# Patient Record
Sex: FEMALE | Race: WHITE | Employment: OTHER | URBAN - METROPOLITAN AREA
[De-identification: names, ages, dates, MRNs, and addresses within clinical notes are randomized per-mention and may not be internally consistent; named-entity substitution may affect disease eponyms.]

---

## 2017-09-01 ENCOUNTER — HOSPITAL ENCOUNTER (OUTPATIENT)
Dept: LAB | Age: 62
Discharge: HOME | End: 2017-09-01
Payer: COMMERCIAL

## 2017-09-01 LAB
ALBUMIN SERPL-MCNC: 4 G/DL (ref 3.4–5)
ALBUMIN/GLOB SERPL: 1.2 {RATIO} (ref 0.8–1.7)
ALP SERPL-CCNC: 57 U/L (ref 45–117)
ALT SERPL-CCNC: 61 U/L (ref 13–56)
ANION GAP SERPL CALC-SCNC: 10 MMOL/L (ref 3–18)
AST SERPL-CCNC: 30 U/L (ref 15–37)
BASOPHILS # BLD: 0.1 K/UL (ref 0–0.06)
BASOPHILS NFR BLD: 1 % (ref 0–2)
BILIRUB SERPL-MCNC: 0.5 MG/DL (ref 0.2–1)
BUN SERPL-MCNC: 13 MG/DL (ref 7–18)
BUN/CREAT SERPL: 16 (ref 12–20)
CALCIUM SERPL-MCNC: 8.8 MG/DL (ref 8.5–10.1)
CHLORIDE SERPL-SCNC: 106 MMOL/L (ref 100–108)
CO2 SERPL-SCNC: 26 MMOL/L (ref 21–32)
CREAT SERPL-MCNC: 0.82 MG/DL (ref 0.6–1.3)
DIFFERENTIAL METHOD BLD: ABNORMAL
EOSINOPHIL # BLD: 0.4 K/UL (ref 0–0.4)
EOSINOPHIL NFR BLD: 6 % (ref 0–5)
ERYTHROCYTE [DISTWIDTH] IN BLOOD BY AUTOMATED COUNT: 13.6 % (ref 11.6–14.5)
GLOBULIN SER CALC-MCNC: 3.3 G/DL (ref 2–4)
GLUCOSE SERPL-MCNC: 114 MG/DL (ref 74–99)
HCT VFR BLD AUTO: 42.6 % (ref 35–45)
HGB BLD-MCNC: 14.2 G/DL (ref 12–16)
INR PPP: 0.9 (ref 0.8–1.2)
LYMPHOCYTES # BLD: 2.8 K/UL (ref 0.9–3.6)
LYMPHOCYTES NFR BLD: 42 % (ref 21–52)
MCH RBC QN AUTO: 31.3 PG (ref 24–34)
MCHC RBC AUTO-ENTMCNC: 33.3 G/DL (ref 31–37)
MCV RBC AUTO: 93.8 FL (ref 74–97)
MONOCYTES # BLD: 0.4 K/UL (ref 0.05–1.2)
MONOCYTES NFR BLD: 7 % (ref 3–10)
NEUTS SEG # BLD: 2.9 K/UL (ref 1.8–8)
NEUTS SEG NFR BLD: 44 % (ref 40–73)
PLATELET # BLD AUTO: 245 K/UL (ref 135–420)
PMV BLD AUTO: 9.6 FL (ref 9.2–11.8)
POTASSIUM SERPL-SCNC: 4.3 MMOL/L (ref 3.5–5.5)
PROT SERPL-MCNC: 7.3 G/DL (ref 6.4–8.2)
PROTHROMBIN TIME: 11.9 SEC (ref 11.5–15.2)
RBC # BLD AUTO: 4.54 M/UL (ref 4.2–5.3)
SODIUM SERPL-SCNC: 142 MMOL/L (ref 136–145)
WBC # BLD AUTO: 6.5 K/UL (ref 4.6–13.2)

## 2017-09-01 PROCEDURE — 36415 COLL VENOUS BLD VENIPUNCTURE: CPT | Performed by: INTERNAL MEDICINE

## 2017-09-01 PROCEDURE — 85025 COMPLETE CBC W/AUTO DIFF WBC: CPT | Performed by: INTERNAL MEDICINE

## 2017-09-01 PROCEDURE — 85610 PROTHROMBIN TIME: CPT | Performed by: INTERNAL MEDICINE

## 2017-09-01 PROCEDURE — 80053 COMPREHEN METABOLIC PANEL: CPT | Performed by: INTERNAL MEDICINE

## 2017-09-01 RX ORDER — METOPROLOL TARTRATE 25 MG/1
12.5 TABLET, FILM COATED ORAL 2 TIMES DAILY
COMMUNITY
End: 2017-09-05

## 2017-09-01 RX ORDER — FLUTICASONE PROPIONATE 50 MCG
2 SPRAY, SUSPENSION (ML) NASAL
COMMUNITY

## 2017-09-01 RX ORDER — MV/FA/DHA/EPA/FISH OIL/SAW/GNK 400MCG-200
500 COMBINATION PACKAGE (EA) ORAL DAILY
COMMUNITY

## 2017-09-01 RX ORDER — GLUCOSAMINE SULFATE 1500 MG
5000 POWDER IN PACKET (EA) ORAL DAILY
COMMUNITY

## 2017-09-01 RX ORDER — LORATADINE 10 MG/1
10 TABLET ORAL
COMMUNITY

## 2017-09-01 RX ORDER — ASPIRIN 81 MG/1
TABLET ORAL DAILY
COMMUNITY

## 2017-09-05 ENCOUNTER — HOSPITAL ENCOUNTER (OUTPATIENT)
Dept: CARDIAC CATH/INVASIVE PROCEDURES | Age: 62
Discharge: HOME | End: 2017-09-05
Attending: INTERNAL MEDICINE | Admitting: INTERNAL MEDICINE
Payer: COMMERCIAL

## 2017-09-05 VITALS
WEIGHT: 183 LBS | HEART RATE: 63 BPM | HEIGHT: 65 IN | RESPIRATION RATE: 13 BRPM | SYSTOLIC BLOOD PRESSURE: 116 MMHG | TEMPERATURE: 99.1 F | BODY MASS INDEX: 30.49 KG/M2 | OXYGEN SATURATION: 97 % | DIASTOLIC BLOOD PRESSURE: 87 MMHG

## 2017-09-05 LAB
FAX TO INFO,FAXT: NORMAL
FAX TO NUMBER,FAXN: NORMAL

## 2017-09-05 PROCEDURE — 74011250636 HC RX REV CODE- 250/636

## 2017-09-05 PROCEDURE — 74011636320 HC RX REV CODE- 636/320: Performed by: INTERNAL MEDICINE

## 2017-09-05 PROCEDURE — 74011250636 HC RX REV CODE- 250/636: Performed by: INTERNAL MEDICINE

## 2017-09-05 PROCEDURE — 74011000250 HC RX REV CODE- 250

## 2017-09-05 PROCEDURE — 77030013797 CARDIAC CATHETERIZATION

## 2017-09-05 PROCEDURE — 74011000250 HC RX REV CODE- 250: Performed by: INTERNAL MEDICINE

## 2017-09-05 RX ORDER — VERAPAMIL HYDROCHLORIDE 2.5 MG/ML
INJECTION, SOLUTION INTRAVENOUS
Status: DISCONTINUED
Start: 2017-09-05 | End: 2017-09-05 | Stop reason: HOSPADM

## 2017-09-05 RX ORDER — FENTANYL CITRATE 50 UG/ML
INJECTION, SOLUTION INTRAMUSCULAR; INTRAVENOUS
Status: COMPLETED
Start: 2017-09-05 | End: 2017-09-05

## 2017-09-05 RX ORDER — SODIUM CHLORIDE 0.9 % (FLUSH) 0.9 %
5-10 SYRINGE (ML) INJECTION AS NEEDED
Status: DISCONTINUED | OUTPATIENT
Start: 2017-09-05 | End: 2017-09-05 | Stop reason: HOSPADM

## 2017-09-05 RX ORDER — FENTANYL CITRATE 50 UG/ML
25-100 INJECTION, SOLUTION INTRAMUSCULAR; INTRAVENOUS
Status: DISCONTINUED | OUTPATIENT
Start: 2017-09-05 | End: 2017-09-05 | Stop reason: HOSPADM

## 2017-09-05 RX ORDER — HEPARIN SODIUM 200 [USP'U]/100ML
INJECTION, SOLUTION INTRAVENOUS
Status: COMPLETED
Start: 2017-09-05 | End: 2017-09-05

## 2017-09-05 RX ORDER — MIDAZOLAM HYDROCHLORIDE 1 MG/ML
.5-2 INJECTION, SOLUTION INTRAMUSCULAR; INTRAVENOUS
Status: DISCONTINUED | OUTPATIENT
Start: 2017-09-05 | End: 2017-09-05 | Stop reason: HOSPADM

## 2017-09-05 RX ORDER — MIDAZOLAM HYDROCHLORIDE 1 MG/ML
INJECTION, SOLUTION INTRAMUSCULAR; INTRAVENOUS
Status: COMPLETED
Start: 2017-09-05 | End: 2017-09-05

## 2017-09-05 RX ORDER — SODIUM CHLORIDE 9 MG/ML
75 INJECTION, SOLUTION INTRAVENOUS CONTINUOUS
Status: DISCONTINUED | OUTPATIENT
Start: 2017-09-05 | End: 2017-09-05 | Stop reason: HOSPADM

## 2017-09-05 RX ORDER — HEPARIN SODIUM 1000 [USP'U]/ML
1000-4000 INJECTION, SOLUTION INTRAVENOUS; SUBCUTANEOUS
Status: DISCONTINUED | OUTPATIENT
Start: 2017-09-05 | End: 2017-09-05 | Stop reason: HOSPADM

## 2017-09-05 RX ORDER — HEPARIN SODIUM 200 [USP'U]/100ML
500 INJECTION, SOLUTION INTRAVENOUS
Status: DISCONTINUED | OUTPATIENT
Start: 2017-09-05 | End: 2017-09-05 | Stop reason: HOSPADM

## 2017-09-05 RX ORDER — LIDOCAINE HYDROCHLORIDE 10 MG/ML
INJECTION INFILTRATION; PERINEURAL
Status: COMPLETED
Start: 2017-09-05 | End: 2017-09-05

## 2017-09-05 RX ORDER — LIDOCAINE HYDROCHLORIDE 10 MG/ML
3-30 INJECTION INFILTRATION; PERINEURAL
Status: DISCONTINUED | OUTPATIENT
Start: 2017-09-05 | End: 2017-09-05 | Stop reason: HOSPADM

## 2017-09-05 RX ORDER — HEPARIN SODIUM 1000 [USP'U]/ML
INJECTION, SOLUTION INTRAVENOUS; SUBCUTANEOUS
Status: COMPLETED
Start: 2017-09-05 | End: 2017-09-05

## 2017-09-05 RX ORDER — SODIUM CHLORIDE 0.9 % (FLUSH) 0.9 %
5-10 SYRINGE (ML) INJECTION EVERY 8 HOURS
Status: DISCONTINUED | OUTPATIENT
Start: 2017-09-05 | End: 2017-09-05 | Stop reason: HOSPADM

## 2017-09-05 RX ADMIN — VERAPAMIL HYDROCHLORIDE 3 ML: 2.5 INJECTION INTRAVENOUS at 14:10

## 2017-09-05 RX ADMIN — HEPARIN SODIUM 4000 UNITS: 1000 INJECTION, SOLUTION INTRAVENOUS; SUBCUTANEOUS at 14:11

## 2017-09-05 RX ADMIN — SODIUM CHLORIDE 75 ML/HR: 900 INJECTION, SOLUTION INTRAVENOUS at 14:13

## 2017-09-05 RX ADMIN — FENTANYL CITRATE 50 MCG: 50 INJECTION, SOLUTION INTRAMUSCULAR; INTRAVENOUS at 14:03

## 2017-09-05 RX ADMIN — IOPAMIDOL 50 ML: 510 INJECTION, SOLUTION INTRAVASCULAR at 14:27

## 2017-09-05 RX ADMIN — LIDOCAINE HYDROCHLORIDE 3 ML: 10 INJECTION INFILTRATION; PERINEURAL at 14:10

## 2017-09-05 RX ADMIN — LIDOCAINE HYDROCHLORIDE 3 ML: 10 INJECTION, SOLUTION INFILTRATION; PERINEURAL at 14:10

## 2017-09-05 RX ADMIN — SODIUM CHLORIDE 75 ML/HR: 900 INJECTION, SOLUTION INTRAVENOUS at 10:53

## 2017-09-05 RX ADMIN — MIDAZOLAM HYDROCHLORIDE 1 MG: 1 INJECTION, SOLUTION INTRAMUSCULAR; INTRAVENOUS at 14:04

## 2017-09-05 RX ADMIN — HEPARIN SODIUM 1000 UNITS: 200 INJECTION, SOLUTION INTRAVENOUS at 14:12

## 2017-09-05 NOTE — DISCHARGE SUMMARY
Discharge Summary    Patient: Hayes Leonardo MRN: [id number]  CSN: [id number]    YOB: 1955  Age: 57 y.o. Sex: female    DOA: 9/5/2017 LOS:  LOS: 0 days   Discharge Date:      Primary Care Provider:  Mario Pennington DO    Admission Diagnoses: angina, abnormal stress test    Discharge Diagnoses:  CAD   Problem List as of 9/5/2017  Date Reviewed: 9/5/2017    None          Discharge Medications:     Current Discharge Medication List      CONTINUE these medications which have NOT CHANGED    Details   DEXTRAN 70/HYPROMELLOSE (ARTIFICIAL TEARS, PF, OP) Apply  to eye. aspirin delayed-release 81 mg tablet Take  by mouth daily. fluticasone (FLONASE) 50 mcg/actuation nasal spray 2 Sprays by Both Nostrils route daily as needed for Rhinitis. calcium-cholecalciferol, d3, (CALCIUM 600 + D) 600-125 mg-unit tab Take 1,200 mg by mouth. cholecalciferol (VITAMIN D3) 1,000 unit cap Take 5,000 Units by mouth daily. krill oil 500 mg cap Take 500 mg by mouth daily. loratadine (CLARITIN) 10 mg tablet Take 10 mg by mouth daily as needed for Allergies. atorvastatin (LIPITOR) 10 mg tablet Take 10 mg by mouth daily. STOP taking these medications       metoprolol tartrate (LOPRESSOR) 25 mg tablet Comments:   Reason for Stopping:               Discharge Condition: Stable     Procedures : LHC and coronary angiogram     Consults: None       PHYSICAL EXAM   Visit Vitals    /68 (BP 1 Location: Right arm, BP Patient Position: Supine)    Pulse (!) 56    Temp 99.1 °F (37.3 °C)    Resp 19    Ht 5' 5\" (1.651 m)    Wt 83 kg (183 lb)    SpO2 97%    BMI 30.45 kg/m2     General: Awake, cooperative, no acute distress    HEENT: NC, Atraumatic. PERRLA, EOMI. Anicteric sclerae. Lungs:  CTA Bilaterally. No Wheezing/Rhonchi/Rales. Heart:  Regular  rhythm,  No murmur, No Rubs, No Gallops  Abdomen: Soft, Non distended, Non tender.  +Bowel sounds,   Extremities: No c/c/e  Psych:   Not anxious or agitated. Neurologic:  No acute neurological deficits. Admission HPI : See H/P    Hospital Course : Out patient LHC and coronary angiogram     Activity: No weight lifting not more than 5 lbs for 1 week from left hand     Diet: Cardiac     Follow-up: Cardiology clinic after 2 weeks. Disposition: Home     Minutes spent on discharge: 30 minutes       Labs: Results:       Chemistry No results for input(s): GLU, NA, K, CL, CO2, BUN, CREA, CA, AGAP, BUCR, TBIL, GPT, AP, TP, ALB, GLOB, AGRAT in the last 72 hours. CBC w/Diff No results for input(s): WBC, RBC, HGB, HCT, PLT, GRANS, LYMPH, EOS, HGBEXT, HCTEXT, PLTEXT in the last 72 hours. Cardiac Enzymes No results for input(s): CPK, CKND1, RANJEET in the last 72 hours. No lab exists for component: CKRMB, TROIP   Coagulation No results for input(s): PTP, INR, APTT in the last 72 hours. No lab exists for component: INREXT    Lipid Panel No results found for: CHOL, CHOLPOCT, CHOLX, CHLST, CHOLV, 267707, HDL, LDL, LDLC, DLDLP, 524980, VLDLC, VLDL, TGLX, TRIGL, TRIGP, TGLPOCT, CHHD, CHHDX   BNP No results for input(s): BNPP in the last 72 hours. Liver Enzymes No results for input(s): TP, ALB, TBIL, AP, SGOT, GPT in the last 72 hours. No lab exists for component: DBIL   Thyroid Studies No results found for: T4, T3U, TSH, TSHEXT         Significant Diagnostic Studies: No results found. LHC and coronary angiogram done via left radial approach. LM is normal.  Proximal LAD has 20% stenosis. LCx has luminal irregularities. LAD/LCx has corkscrew appearance and has slow flow. RCA is dominant and has luminal irregularities. RCA has corkscrew appearance.      Patient tolerated procedure.     Medical management.       CC: Gildardo Duong DO

## 2017-09-05 NOTE — ROUTINE PROCESS
TRANSFER - OUT REPORT:  Verbal report given to [facility] RN on Brandon Dewey Pages being transferred to care unit for routine post - op   Report consisted of patients Situation, Background, Assessment and   Recommendations(SBAR). Information from the following report(s) SBAR was reviewed with the receiving nurse.     Cath Lab Report:    Procedure:  [x ] LHC  [ ] RHC  [ ] PTCA   [ ] Peripheral   [ ] Pacemaker [ ] BRANDON  [ ] [address]    Access site:   [x ] Radial     [ ] Brachial     [ ] Femoral    [ ] Jugular   [ ] Chest Wall       Sheath:           [x ] Pulled in Cath Lab   [ ] In place   [ ] To be pulled after:         Closure:          [x ] TR Band [ ] Radial Band  [ ] Right [x ] Left    [ ] Manual Pressure     [ ] Angio Seal     [ ] Star Close    [ ] Per Close    [ ] Safe Guard    Site Assessment:   [x ] Clean, Dry, No bleeding    [ ] Minor oozing          [ ] Hematoma: Description:  Intra procedure Medications:    Fentanyl: 50 mcg  Versed: 1 mg  Other:  Antiplatelet:    Lines:        Peripheral IV 09/05/17 Right Hand (Active)   Site Assessment Clean, dry, & intact 9/5/2017 10:53 AM   Phlebitis Assessment 0 9/5/2017 10:53 AM   Infiltration Assessment 0 9/5/2017 10:53 AM   Dressing Status Clean, dry, & intact 9/5/2017 10:53 AM   Dressing Type Transparent 9/5/2017 10:53 AM   Hub Color/Line Status Infusing;Pink 9/5/2017 10:53 AM   Alcohol Cap Used No 9/5/2017 10:53 AM          Patient Vitals for the past 4 hrs:   Temp Pulse Resp BP SpO2   09/05/17 1426 99.1 °F (37.3 °C) (!) 56 19 111/68 97 %   09/05/17 1050 99 °F (37.2 °C) (!) 53 18 120/76 97 %         Extended / Orthostatic Vitals:    Vital Signs  Level of Consciousness: Alert (09/05/17 1426)  Temp: 99.1 °F (37.3 °C) (09/05/17 1426)  Temp Source: Oral (09/05/17 1426)  Pulse (Heart Rate): (!) 56 (09/05/17 1426)  Heart Rate Source: Monitor (09/05/17 1426)  Cardiac Rhythm: Sinus bradycardia (09/05/17 1426)  Resp Rate: 19 (09/05/17 1426)  BP: 111/68 (09/05/17 1426)  MAP (Calculated): 82 (09/05/17 1426)  BP 1 Location: Right arm (09/05/17 1426)  BP Patient Position: Supine (09/05/17 1426)  MEWS Score: 1 (09/05/17 1426)         Oxygen Therapy  O2 Sat (%): 97 % (09/05/17 1426)  O2 Device: Nasal cannula (09/05/17 1426)  O2 Flow Rate (L/min): 2 l/min (09/05/17 1426)          Opportunity for questions and clarification was provided.

## 2017-09-05 NOTE — ROUTINE PROCESS
TRANSFER - IN REPORT:    Verbal report received from care unit on Brandon Dewey Pages  being received from care unit for ordered procedure      Report consisted of patients Situation, Background, Assessment and   Recommendations(SBAR). Information from the following report(s) SBAR was reviewed with the receiving nurse. Opportunity for questions and clarification was provided. Assessment completed upon patients arrival to unit and care assumed.        Sheath:                            Peripheral Intravenous Line:  Peripheral IV 09/05/17 Right Hand (Active)   Site Assessment Clean, dry, & intact 9/5/2017 10:53 AM   Phlebitis Assessment 0 9/5/2017 10:53 AM   Infiltration Assessment 0 9/5/2017 10:53 AM   Dressing Status Clean, dry, & intact 9/5/2017 10:53 AM   Dressing Type Transparent 9/5/2017 10:53 AM   Hub Color/Line Status Infusing;Pink 9/5/2017 10:53 AM   Alcohol Cap Used No 9/5/2017 10:53 AM       Extended / Orthostatic Vitals:    Vital Signs  Level of Consciousness: Alert (09/05/17 1050)  Temp: 99 °F (37.2 °C) (09/05/17 1050)  Temp Source: Oral (09/05/17 1050)  Pulse (Heart Rate): (!) 53 (09/05/17 1050)  Heart Rate Source: Monitor (09/05/17 1050)  Cardiac Rhythm: Sinus bradycardia (09/05/17 1050)  Resp Rate: 18 (09/05/17 1050)  BP: 120/76 (09/05/17 1050)  MAP (Calculated): 91 (09/05/17 1050)  BP 1 Location: Right arm (09/05/17 1050)  BP Patient Position: At rest (09/05/17 1050)  MEWS Score: 1 (09/05/17 1050)         Oxygen Therapy  O2 Sat (%): 97 % (09/05/17 1050)  O2 Device: Room air (09/05/17 1050)    Accompanied by Cath Lab [facility]

## 2017-09-05 NOTE — PROCEDURES
LHC and coronary angiogram done via left radial approach. LM is normal.  Proximal LAD has 20% stenosis. LCx has luminal irregularities. LAD/LCx has corkscrew appearance and has slow flow. RCA is dominant and has luminal irregularities. RCA has corkscrew appearance. Patient tolerated procedure. Medical management.

## 2017-09-05 NOTE — ROUTINE PROCESS
Cardiac Cath Lab:  Pre Procedure Chart Check     Patients chart was accessed and reviewed for possible and/or scheduled procedure. Creatinine Clearance:  CREATININE: 0.82 MG/DL (09/01/17 1010)  Estimated creatinine clearance: 75.7 mL/min    Total Contrast  Load:  3 x estimated clearance amount=  227.1  ml    75% of Contrast Load:  0.75 x Total Contrast Load=    170.3  ml    No results for input(s): WBC, RBC, HCT, HGB, PLT, INR, APTT, PTP, NA, K, BUN, CREA, GFRAA, GFRNA, CA, MAG, CPK, CKMB, CKNDX, CKND1, TROPT, TROIQ, BNPP, BNP, HCTEXT, HGBEXT, PLTEXT in the last 72 hours. No lab exists for component: DDIMER, GLUCOSE, INREXT    BMI: Body mass index is 30.45 kg/(m^2). ALLERGIES: No Known Allergies    Lines:        Peripheral IV 09/05/17 Right Hand (Active)   Site Assessment Clean, dry, & intact 9/5/2017 10:53 AM   Phlebitis Assessment 0 9/5/2017 10:53 AM   Infiltration Assessment 0 9/5/2017 10:53 AM   Dressing Status Clean, dry, & intact 9/5/2017 10:53 AM   Dressing Type Transparent 9/5/2017 10:53 AM   Hub Color/Line Status Infusing;Pink 9/5/2017 10:53 AM   Alcohol Cap Used No 9/5/2017 10:53 AM          History:    Past Medical History:   Diagnosis Date    Arthritis     Chronic pain     lumbar, right leg and right foot     No past surgical history on file. There is no problem list on file for this patient.

## 2017-09-05 NOTE — H&P
Date of Surgery Update:  Brandon Mena was seen and examined. History and physical has been reviewed. The patient has been examined.  There have been no significant clinical changes since the completion of the originally dated History and Physical.    Signed By: Juliano Villarreal MD     September 5, 2017 2:01 PM

## 2017-09-05 NOTE — PROGRESS NOTES
TR band removed, slight bleeding noted. Pressure applied and bleeding subsided . 2x2 with transparent dressing in place. Lt wrist immobilizer back in place.

## 2017-09-05 NOTE — DISCHARGE INSTRUCTIONS
Cardiac Catheterization/Angiography Discharge Instructions    *Check the puncture site frequently for swelling or bleeding. If you see any bleeding, lie down and apply pressure over the area with a clean town or washcloth. Notify your doctor for any redness, swelling, drainage or oozing from the puncture site. Notify your doctor for any fever or chills. *If the leg or arm with the puncture becomes cold, numb or painful, go to your nearest emergency room. *Activity should be limited for the next 48 hours. Climb stairs as little as possible and avoid any stooping, bending or strenuous activity for 48 hours. No heavy lifting (anything over 10 pounds) for three days. *Do not drive for 48 hours. *You may resume your usual diet. Drink more fluids than usual.    *Have a responsible person drive you home and stay with you for at least 24 hours after your heart catheterization/angiography. *You may remove the bandage from your Left and Arm in 24 hours. You may shower in 24 hours. No tub baths, hot tubs or swimming for one week. Do not place any lotions, creams, powders, ointments over the puncture site for one week. You may place a clean band-aid over the puncture site each day for 5 days. Change this daily. Sedation for a Medical Procedure: Care Instructions  Your Care Instructions  For a minor procedure or surgery, you will get a sedative to help you relax. This drug will make you sleepy. It is usually given in a vein (by IV). A shot may also be used to numb the area. If you had local anesthesia, you may feel some pain and discomfort as it wears off. If you have pain, don't be afraid to say so. Pain medicine works better if you take it before the pain gets bad. Common side effects from sedation include:  · Feeling sleepy. (Your doctors and nurses will make sure you are not too sleepy to go home.)  · Nausea and vomiting. This usually does not last long. · Feeling tired.   Follow-up care is a key part of your treatment and safety. Be sure to make and go to all appointments, and call your doctor if you are having problems. It's also a good idea to know your test results and keep a list of the medicines you take. How can you care for yourself at home? Activity  · Don't do anything for 24 hours that requires attention to detail. It takes time for the medicine effects to completely wear off. · For your safety, you should not drive or operate any machinery that could be dangerous until the medicine wears off and you can think clearly and react easily. · Rest when you feel tired. Getting enough sleep will help you recover. Diet  · You can eat your normal diet, unless your doctor gives you other instructions. If your stomach is upset, try clear liquids and bland, low-fat foods like plain toast or rice. · Drink plenty of fluids (unless your doctor tells you not to). · Don't drink alcohol for 24 hours. Medicines  · Be safe with medicines. Read and follow all instructions on the label. ¨ If the doctor gave you a prescription medicine for pain, take it as prescribed. ¨ If you are not taking a prescription pain medicine, ask your doctor if you can take an over-the-counter medicine. · If you think your pain medicine is making you sick to your stomach:  ¨ Take your medicine after meals (unless your doctor has told you not to). ¨ Ask your doctor for a different pain medicine. When should you call for help? Call 911 anytime you think you may need emergency care. For example, call if:  · You have severe trouble breathing. · You passed out (lost consciousness). Call your doctor now or seek immediate medical care if:  · You have trouble breathing. · You have ongoing or worsening nausea or vomiting. · You have a fever. · You have a new or worse headache. · The medicine is not wearing off and you can't think clearly.   Watch closely for changes in your health, and be sure to contact your doctor if:  · You do not get better as expected. Where can you learn more? Go to http://radha-alyse.info/. Enter [email or web address] in the search box to learn more about \"Sedation for a Medical Procedure: Care Instructions. \"  Current as of: August 14, 2016  Content Version: 11.3  © 7797-4739 [employer]. Care instructions adapted under license by [employer] (which disclaims liability or warranty for this information). If you have questions about a medical condition or this instruction, always ask your healthcare professional. [address] any warranty or liability for your use of this information. DISCHARGE SUMMARY from Nurse    The following personal items are in your possession at time of discharge:       Visual Aid: Glasses                            PATIENT INSTRUCTIONS:    After general anesthesia or intravenous sedation, for 24 hours or while taking prescription Narcotics:  · Limit your activities  · Do not drive and operate hazardous machinery  · Do not make important personal or business decisions  · Do  not drink alcoholic beverages  · If you have not urinated within 8 hours after discharge, please contact your surgeon on call. Report the following to your surgeon:  · Excessive pain, swelling, redness or odor of or around the surgical area  · Temperature over 100.5  · Nausea and vomiting lasting longer than 4 hours or if unable to take medications  · Any signs of decreased circulation or nerve impairment to extremity: change in color, persistent  numbness, tingling, coldness or increase pain  · Any questions        What to do at Home:  Recommended activity: Activity as tolerated and no driving for today,     *  Please give a list of your current medications to your Primary Care Provider.     *  Please update this list whenever your medications are discontinued, doses are      changed, or new medications (including over-the-counter products) are added.    *  Please carry medication information at all times in case of emergency situations. These are general instructions for a healthy lifestyle:    No smoking/ No tobacco products/ Avoid exposure to second hand smoke    Surgeon General's Warning:  Quitting smoking now greatly reduces serious risk to your health. Obesity, smoking, and sedentary lifestyle greatly increases your risk for illness    A healthy diet, regular physical exercise & weight monitoring are important for maintaining a healthy lifestyle    You may be retaining fluid if you have a history of heart failure or if you experience any of the following symptoms:  Weight gain of 3 pounds or more overnight or 5 pounds in a week, increased swelling in our hands or feet or shortness of breath while lying flat in bed. Please call your doctor as soon as you notice any of these symptoms; do not wait until your next office visit. Recognize signs and symptoms of STROKE:    F-face looks uneven    A-arms unable to move or move unevenly    S-speech slurred or non-existent    T-time-call 911 as soon as signs and symptoms begin-DO NOT go       Back to bed or wait to see if you get better-TIME IS BRAIN. Warning Signs of HEART ATTACK     Call 911 if you have these symptoms:   Chest discomfort. Most heart attacks involve discomfort in the center of the chest that lasts more than a few minutes, or that goes away and comes back. It can feel like uncomfortable pressure, squeezing, fullness, or pain.  Discomfort in other areas of the upper body. Symptoms can include pain or discomfort in one or both arms, the back, neck, jaw, or stomach.  Shortness of breath with or without chest discomfort.  Other signs may include breaking out in a cold sweat, nausea, or lightheadedness. Don't wait more than five minutes to call 911 - MINUTES MATTER! Fast action can save your life. Calling 911 is almost always the fastest way to get lifesaving treatment. Emergency Medical Services staff can begin treatment when they arrive -- up to an hour sooner than if someone gets to the hospital by car. The discharge information has been reviewed with the patient and spouse. The patient and spouse verbalized understanding. Discharge medications reviewed with the patient and spouse and appropriate educational materials and side effects teaching were provided.

## 2017-09-05 NOTE — IP AVS SNAPSHOT
Sarabjit Miller 
 
 
 [address] [facility] [address] 
[phone] Patient: Naomy Nelson MRN: [id number] HDF:[id number] You are allergic to the following No active allergies Recent Documentation Height Weight BMI OB Status Smoking Status 1.651 m 83 kg 30.45 kg/m2 Postmenopausal Former Smoker Emergency Contacts Name Discharge Info Relation Home Work Mobile Ho Nieves DISCHARGE CAREGIVER [3] Spouse [3] [phone] [address] CAREGIVER [3]    [phone] About your hospitalization You were admitted on:  September 5, 2017 You last received care in the:  [address] You were discharged on:  September 5, 2017 Unit phone number:  [phone] Why you were hospitalized Your primary diagnosis was:  Not on File Providers Seen During Your Hospitalizations Provider Role Specialty Primary office phone Ana Matthew MD Attending Provider Cardiology [phone] Your Primary Care Physician (PCP) Primary Care Physician Office Phone Office Fax Valerie Benitez [phone] Follow-up Information Follow up With Details Comments Contact Info Steve Squires DO   [address] 
[phone] Current Discharge Medication List  
  
CONTINUE these medications which have NOT CHANGED Dose & Instructions Dispensing Information Comments Morning Noon Evening Bedtime ARTIFICIAL TEARS (PF) OP Your last dose was: Your next dose is:    
   
   
 Apply  to eye. Refills:  0  
     
   
   
   
  
 aspirin delayed-release 81 mg tablet Your last dose was: Your next dose is: Take  by mouth daily. Refills:  0  
     
   
   
   
  
 CALCIUM 600 + D 600-125 mg-unit Tab Generic drug:  calcium-cholecalciferol (d3) Your last dose was: Your next dose is:    
   
   
 Dose:  1200 mg Take 1,200 mg by mouth. Refills:  0 CLARITIN 10 mg tablet Generic drug:  loratadine Your last dose was: Your next dose is:    
   
   
 Dose:  10 mg Take 10 mg by mouth daily as needed for Allergies. Refills:  0  
     
   
   
   
  
 FLONASE 50 mcg/actuation nasal spray Generic drug:  fluticasone Your last dose was: Your next dose is:    
   
   
 Dose:  2 Spray 2 Sprays by Both Nostrils route daily as needed for Rhinitis. Refills:  0  
     
   
   
   
  
 krill oil 500 mg Cap Your last dose was: Your next dose is:    
   
   
 Dose:  500 mg Take 500 mg by mouth daily. Refills:  0 LIPITOR 10 mg tablet Generic drug:  atorvastatin Your last dose was: Your next dose is:    
   
   
 Dose:  10 mg Take 10 mg by mouth daily. Refills:  0  
     
   
   
   
  
 VITAMIN D3 1,000 unit Cap Generic drug:  cholecalciferol Your last dose was: Your next dose is:    
   
   
 Dose:  5000 Units Take 5,000 Units by mouth daily. Refills:  0 STOP taking these medications   
 metoprolol tartrate 25 mg tablet Commonly known as:  LOPRESSOR Discharge Instructions Cardiac Catheterization/Angiography Discharge Instructions *Check the puncture site frequently for swelling or bleeding. If you see any bleeding, lie down and apply pressure over the area with a clean town or washcloth. Notify your doctor for any redness, swelling, drainage or oozing from the puncture site. Notify your doctor for any fever or chills. *If the leg or arm with the puncture becomes cold, numb or painful, go to your nearest emergency room. *Activity should be limited for the next 48 hours.  Climb stairs as little as possible and avoid any stooping, bending or strenuous activity for 48 hours. No heavy lifting (anything over 10 pounds) for three days. *Do not drive for 48 hours. *You may resume your usual diet. Drink more fluids than usual. 
 
*Have a responsible person drive you home and stay with you for at least 24 hours after your heart catheterization/angiography. *You may remove the bandage from your Left and Arm in 24 hours. You may shower in 24 hours. No tub baths, hot tubs or swimming for one week. Do not place any lotions, creams, powders, ointments over the puncture site for one week. You may place a clean band-aid over the puncture site each day for 5 days. Change this daily. Sedation for a Medical Procedure: Care Instructions Your Care Instructions For a minor procedure or surgery, you will get a sedative to help you relax. This drug will make you sleepy. It is usually given in a vein (by IV). A shot may also be used to numb the area. If you had local anesthesia, you may feel some pain and discomfort as it wears off. If you have pain, don't be afraid to say so. Pain medicine works better if you take it before the pain gets bad. Common side effects from sedation include: · Feeling sleepy. (Your doctors and nurses will make sure you are not too sleepy to go home.) · Nausea and vomiting. This usually does not last long. · Feeling tired. Follow-up care is a key part of your treatment and safety. Be sure to make and go to all appointments, and call your doctor if you are having problems. It's also a good idea to know your test results and keep a list of the medicines you take. How can you care for yourself at home? Activity · Don't do anything for 24 hours that requires attention to detail. It takes time for the medicine effects to completely wear off. · For your safety, you should not drive or operate any machinery that could be dangerous until the medicine wears off and you can think clearly and react easily. · Rest when you feel tired. Getting enough sleep will help you recover. Diet · You can eat your normal diet, unless your doctor gives you other instructions. If your stomach is upset, try clear liquids and bland, low-fat foods like plain toast or rice. · Drink plenty of fluids (unless your doctor tells you not to). · Don't drink alcohol for 24 hours. Medicines · Be safe with medicines. Read and follow all instructions on the label. ¨ If the doctor gave you a prescription medicine for pain, take it as prescribed. ¨ If you are not taking a prescription pain medicine, ask your doctor if you can take an over-the-counter medicine. · If you think your pain medicine is making you sick to your stomach: 
¨ Take your medicine after meals (unless your doctor has told you not to). ¨ Ask your doctor for a different pain medicine. When should you call for help? Call 911 anytime you think you may need emergency care. For example, call if: 
· You have severe trouble breathing. · You passed out (lost consciousness). Call your doctor now or seek immediate medical care if: 
· You have trouble breathing. · You have ongoing or worsening nausea or vomiting. · You have a fever. · You have a new or worse headache. · The medicine is not wearing off and you can't think clearly. Watch closely for changes in your health, and be sure to contact your doctor if: 
· You do not get better as expected. Where can you learn more? Go to http://radha-alyse.info/. Enter P251 in the search box to learn more about \"Sedation for a Medical Procedure: Care Instructions. \" Current as of: August 14, 2016 Content Version: 11.3 © 6483-4742 [employer]. Care instructions adapted under license by [employer] (which disclaims liability or warranty for this information).  If you have questions about a medical condition or this instruction, always ask your healthcare professional. [employer] disclaims any warranty or liability for your use of this information. DISCHARGE SUMMARY from Nurse The following personal items are in your possession at time of discharge: 
 
  
Visual Aid: Glasses PATIENT INSTRUCTIONS: 
 
 
F-face looks uneven A-arms unable to move or move unevenly S-speech slurred or non-existent T-time-call 911 as soon as signs and symptoms begin-DO NOT go Back to bed or wait to see if you get better-TIME IS BRAIN. Warning Signs of HEART ATTACK Call 911 if you have these symptoms: 
? Chest discomfort. Most heart attacks involve discomfort in the center of the chest that lasts more than a few minutes, or that goes away and comes back. It can feel like uncomfortable pressure, squeezing, fullness, or pain. ? Discomfort in other areas of the upper body. Symptoms can include pain or discomfort in one or both arms, the back, neck, jaw, or stomach. ? Shortness of breath with or without chest discomfort. ? Other signs may include breaking out in a cold sweat, nausea, or lightheadedness. Don't wait more than five minutes to call [address]! Fast action can save your life. Calling 911 is almost always the fastest way to get lifesaving treatment. Emergency Medical Services staff can begin treatment when they arrive  up to an hour sooner than if someone gets to the hospital by car. The discharge information has been reviewed with the patient and spouse. The patient and spouse verbalized understanding. Discharge medications reviewed with the patient and spouse and appropriate educational materials and side effects teaching were provided. Discharge Orders None [facility] Announcement We are excited to announce that we are making your provider's discharge notes available to you in [employer]. You will see these notes when they are completed and signed by the physician that discharged you from your recent hospital stay. If you have any questions or concerns about any information you see in [employer], please call the Health Information Department where you were seen or reach out to your Primary Care Provider for more information about your plan of care. Introducing \[facility] [place] [facility]\"" & HEALTH SERVICES! Rishabh Correa introduces [employer] patient portal. Now you can access parts of your medical record, email your doctor's office, and request medication refills online. 1. In your internet browser, go to [email or web address] 2. Click on the First Time User? Click Here link in the Sign In box. You will see the New Member Sign Up page. 3. Enter your [employer] Access Code exactly as it appears below. You will not need to use this code after youve completed the sign-up process. If you do not sign up before the expiration date, you must request a new code. · [employer] Access Code: [email or web address] Expires: 11/30/2017 10:08 AM 
 
4. Enter the last four digits of your Social Security Number (xxxx) and Date of Birth (mm/dd/yyyy) as indicated and click Submit. You will be taken to the next sign-up page. 5. Create a [employer] ID. This will be your [employer] login ID and cannot be changed, so think of one that is secure and easy to remember. 6. Create a [employer] password. You can change your password at any time. 7. Enter your Password Reset Question and Answer. This can be used at a later time if you forget your password. 8. Enter your e-mail address. You will receive e-mail notification when new information is available in [address] 9. Click Sign Up. You can now view and download portions of your medical record.  
10. Click the Download Summary menu link to download a portable copy of your medical information. If you have questions, please visit the Frequently Asked Questions section of the MyChart website. Remember, MyChart is NOT to be used for urgent needs. For medical emergencies, dial 911. Now available from your iPhone and Android! General Information Please provide this summary of care documentation to your next provider. Patient Signature:  ____________________________________________________________ Date:  ____________________________________________________________  
  
Aretha Pane Provider Signature:  ____________________________________________________________ Date:  ____________________________________________________________

## 2018-01-13 ENCOUNTER — HOSPITAL ENCOUNTER (EMERGENCY)
Age: 63
Discharge: HOME | End: 2018-01-14
Attending: INTERNAL MEDICINE
Payer: COMMERCIAL

## 2018-01-13 ENCOUNTER — APPOINTMENT (OUTPATIENT)
Dept: GENERAL RADIOLOGY | Age: 63
End: 2018-01-13
Attending: INTERNAL MEDICINE
Payer: COMMERCIAL

## 2018-01-13 DIAGNOSIS — S80.211A ABRASION OF RIGHT KNEE, INITIAL ENCOUNTER: ICD-10-CM
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
## 2018-01-13 DIAGNOSIS — S42.291A CLOSED FRACTURE OF HEAD OF RIGHT HUMERUS, INITIAL ENCOUNTER: Primary | ICD-10-CM

## 2018-01-13 PROCEDURE — 74011250637 HC RX REV CODE- 250/637: Performed by: INTERNAL MEDICINE

## 2018-01-13 PROCEDURE — 99283 EMERGENCY DEPT VISIT LOW MDM: CPT

## 2018-01-13 PROCEDURE — 73030 X-RAY EXAM OF SHOULDER: CPT

## 2018-01-13 RX ORDER — FENTANYL CITRATE 50 UG/ML
50 INJECTION, SOLUTION INTRAMUSCULAR; INTRAVENOUS ONCE
Status: DISCONTINUED | OUTPATIENT
Start: 2018-01-13 | End: 2018-01-13

## 2018-01-13 RX ORDER — HYDROCODONE BITARTRATE AND ACETAMINOPHEN 5; 325 MG/1; MG/1
1 TABLET ORAL
Status: COMPLETED | OUTPATIENT
Start: 2018-01-13 | End: 2018-01-13

## 2018-01-13 RX ORDER — IBUPROFEN 600 MG/1
600 TABLET ORAL
Qty: 20 TAB | Refills: 0 | Status: SHIPPED | OUTPATIENT
Start: 2018-01-13

## 2018-01-13 RX ORDER — HYDROCODONE BITARTRATE AND ACETAMINOPHEN 5; 325 MG/1; MG/1
TABLET ORAL
Qty: 12 TAB | Refills: 0 | Status: SHIPPED | OUTPATIENT
Start: 2018-01-13

## 2018-01-13 RX ORDER — DOCUSATE SODIUM 100 MG/1
100 CAPSULE, LIQUID FILLED ORAL
Qty: 20 CAP | Refills: 0 | Status: SHIPPED | OUTPATIENT
Start: 2018-01-13 | End: 2018-01-23

## 2018-01-13 RX ADMIN — HYDROCODONE BITARTRATE AND ACETAMINOPHEN 1 TABLET: 5; 325 TABLET ORAL at 23:05

## 2018-01-14 VITALS
TEMPERATURE: 98.2 F | RESPIRATION RATE: 16 BRPM | OXYGEN SATURATION: 98 % | HEART RATE: 71 BPM | DIASTOLIC BLOOD PRESSURE: 64 MMHG | WEIGHT: 180 LBS | HEIGHT: 66 IN | BODY MASS INDEX: 28.93 KG/M2 | SYSTOLIC BLOOD PRESSURE: 142 MMHG

## 2018-01-14 PROCEDURE — A4565 SLINGS: HCPCS

## 2018-01-14 NOTE — DISCHARGE INSTRUCTIONS
Scrapes (Abrasions): Care Instructions  Your Care Instructions  Scrapes (abrasions) are wounds where your skin has been rubbed or torn off. Most scrapes do not go deep into the skin, but some may remove several layers of skin. Scrapes usually don't bleed much, but they may ooze pinkish fluid. Scrapes on the head or face may appear worse than they are. They may bleed a lot because of the good blood supply to this area. Most scrapes heal well and may not need a bandage. They usually heal within 3 to 7 days. A large, deep scrape may take 1 to 2 weeks or longer to heal. A scab may form on some scrapes. Follow-up care is a key part of your treatment and safety. Be sure to make and go to all appointments, and call your doctor if you are having problems. It's also a good idea to know your test results and keep a list of the medicines you take. How can you care for yourself at home? · If your doctor told you how to care for your wound, follow your doctor's instructions. If you did not get instructions, follow this general advice:  ¨ Wash the scrape with clean water 2 times a day. Don't use hydrogen peroxide or alcohol, which can slow healing. ¨ You may cover the scrape with a thin layer of petroleum jelly, such as Vaseline, and a nonstick bandage. ¨ Apply more petroleum jelly and replace the bandage as needed. · Prop up the injured area on a pillow anytime you sit or lie down during the next 3 days. Try to keep it above the level of your heart. This will help reduce swelling. · Be safe with medicines. Take pain medicines exactly as directed. ¨ If the doctor gave you a prescription medicine for pain, take it as prescribed. ¨ If you are not taking a prescription pain medicine, ask your doctor if you can take an over-the-counter medicine. When should you call for help?   Call your doctor now or seek immediate medical care if:  ? · You have signs of infection, such as:  ¨ Increased pain, swelling, warmth, or redness around the scrape. ¨ Red streaks leading from the scrape. ¨ Pus draining from the scrape. ¨ A fever. ? · The scrape starts to bleed, and blood soaks through the bandage. Oozing small amounts of blood is normal.   ? Watch closely for changes in your health, and be sure to contact your doctor if the scrape is not getting better each day. Where can you learn more? Go to http://radha-alyse.info/. Enter A374 in the search box to learn more about \"Scrapes (Abrasions): Care Instructions. \"  Current as of: March 20, 2017  Content Version: 11.4  © 5070-0312 [employer]. Care instructions adapted under license by [employer] (which disclaims liability or warranty for this information). If you have questions about a medical condition or this instruction, always ask your healthcare professional. [place] [address] any warranty or liability for your use of this information. Broken Arm: Care Instructions  Your Care Instructions  Fractures can range from a small, hairline crack, to a bone or bones broken into two or more pieces. Your treatment depends on how bad the break is. Your doctor may have put your arm in a splint or cast to allow it to heal or to keep it stable until you see another doctor. It may take weeks or months for your arm to heal. You can help your arm heal with some care at home. You heal best when you take good care of yourself. Eat a variety of healthy foods, and don't smoke. You may have had a sedative to help you relax. You may be unsteady after having sedation. It can take a few hours for the medicine's effects to wear off. Common side effects of sedation include nausea, vomiting, and feeling sleepy or tired. The doctor has checked you carefully, but problems can develop later. If you notice any problems or new symptoms, get medical treatment right away. Follow-up care is a key part of your treatment and safety.  Be sure to make and go to all appointments, and call your doctor if you are having problems. It's also a good idea to know your test results and keep a list of the medicines you take. How can you care for yourself at home? · If the doctor gave you a sedative:  ¨ For 24 hours, don't do anything that requires attention to detail. It takes time for the medicine's effects to completely wear off. ¨ For your safety, do not drive or operate any machinery that could be dangerous. Wait until the medicine wears off and you can think clearly and react easily. · Put ice or a cold pack on your arm for 10 to 20 minutes at a time. Try to do this every 1 to 2 hours for the next 3 days (when you are awake). Put a thin cloth between the ice and your cast or splint. Keep the cast or splint dry. · Follow the cast care instructions your doctor gives you. If you have a splint, do not take it off unless your doctor tells you to. · Be safe with medicines. Take pain medicines exactly as directed. ¨ If the doctor gave you a prescription medicine for pain, take it as prescribed. ¨ If you are not taking a prescription pain medicine, ask your doctor if you can take an over-the-counter medicine. · Prop up your arm on pillows when you sit or lie down in the first few days after the injury. Keep the arm higher than the level of your heart. This will help reduce swelling. · Follow instructions for exercises to keep your arm strong. · Wiggle your fingers and wrist often to reduce swelling and stiffness. When should you call for help? Call 911 anytime you think you may need emergency care. For example, call if:  ? · You are very sleepy and you have trouble waking up. ?Call your doctor now or seek immediate medical care if:  ? · You have new or worse nausea or vomiting. ? · You have new or worse pain. ? · Your hand or fingers are cool or pale or change color. ? · Your cast or splint feels too tight.    ? · You have tingling, weakness, or numbness in your hand or fingers. ? Watch closely for changes in your health, and be sure to contact your doctor if:  ? · You do not get better as expected. ? · You have problems with your cast or splint. Where can you learn more? Go to http://radha-alyse.info/. Enter P334 in the search box to learn more about \"Broken Arm: Care Instructions. \"  Current as of: March 21, 2017  Content Version: 11.4  © 9662-8521 [employer]. Care instructions adapted under license by [employer] (which disclaims liability or warranty for this information). If you have questions about a medical condition or this instruction, always ask your healthcare professional. [address] any warranty or liability for your use of this information. Preventing Falls: Care Instructions  Your Care Instructions    Getting around your home safely can be a challenge if you have injuries or health problems that make it easy for you to fall. Loose rugs and furniture in walkways are among the dangers for many older people who have problems walking or who have poor eyesight. People who have conditions such as arthritis, osteoporosis, or dementia also have to be careful not to fall. You can make your home safer with a few simple measures. Follow-up care is a key part of your treatment and safety. Be sure to make and go to all appointments, and call your doctor if you are having problems. It's also a good idea to know your test results and keep a list of the medicines you take. How can you care for yourself at home? Taking care of yourself  · You may get dizzy if you do not drink enough water. To prevent dehydration, drink plenty of fluids, enough so that your urine is light yellow or clear like water. Choose water and other caffeine-free clear liquids.  If you have kidney, heart, or liver disease and have to limit fluids, talk with your doctor before you increase the amount of fluids you drink. · Exercise regularly to improve your strength, muscle tone, and balance. Walk if you can. Swimming may be a good choice if you cannot walk easily. · Have your vision and hearing checked each year or any time you notice a change. If you have trouble seeing and hearing, you might not be able to avoid objects and could lose your balance. · Know the side effects of the medicines you take. Ask your doctor or pharmacist whether the medicines you take can affect your balance. Sleeping pills or sedatives can affect your balance. · Limit the amount of alcohol you drink. Alcohol can impair your balance and other senses. · Ask your doctor whether calluses or corns on your feet need to be removed. If you wear loose-fitting shoes because of calluses or corns, you can lose your balance and fall. · Talk to your doctor if you have numbness in your feet. Preventing falls at home  · Remove raised doorway thresholds, throw rugs, and clutter. Repair loose carpet or raised areas in the floor. · Move furniture and electrical cords to keep them out of walking paths. · Use nonskid floor wax, and wipe up spills right away, especially on ceramic tile floors. · If you use a walker or cane, put rubber tips on it. If you use crutches, clean the bottoms of them regularly with an abrasive pad, such as steel wool. · Keep your house well lit, especially Adri Krabbe, and outside walkways. Use night-lights in areas such as hallways and bathrooms. Add extra light switches or use remote switches (such as switches that go on or off when you clap your hands) to make it easier to turn lights on if you have to get up during the night. · Install sturdy handrails on stairways. · Move items in your cabinets so that the things you use a lot are on the lower shelves (about waist level). · Keep a cordless phone and a flashlight with new batteries by your bed.  If possible, put a phone in each of the main rooms of your house, or carry a cell phone in case you fall and cannot reach a phone. Or, you can wear a device around your neck or wrist. You push a button that sends a signal for help. · Wear low-heeled shoes that fit well and give your feet good support. Use footwear with nonskid soles. Check the heels and soles of your shoes for wear. Repair or replace worn heels or soles. · Do not wear socks without shoes on wood floors. · Walk on the grass when the sidewalks are slippery. If you live in an area that gets snow and ice in the winter, sprinkle salt on slippery steps and sidewalks. Preventing falls in the bath  · Install grab bars and nonskid mats inside and outside your shower or tub and near the toilet and sinks. · Use shower chairs and bath benches. · Use a hand-held shower head that will allow you to sit while showering. · Get into a tub or shower by putting the weaker leg in first. Get out of a tub or shower with your strong side first.  · Repair loose toilet seats and consider installing a raised toilet seat to make getting on and off the toilet easier. · Keep your bathroom door unlocked while you are in the shower. Where can you learn more? Go to http://radha-alyse.info/. Enter 0476 [id number] 69 [id number] in the search box to learn more about \"Preventing Falls: Care Instructions. \"  Current as of: May 12, 2017  Content Version: 11.4  © 8010-2454 [employer]. Care instructions adapted under license by [employer] (which disclaims liability or warranty for this information). If you have questions about a medical condition or this instruction, always ask your healthcare professional. [place] [address] any warranty or liability for your use of this information.

## 2018-01-14 NOTE — ED TRIAGE NOTES
Hernesto Mock while on walk and now has pain and limited ROM in right shoulder. Sepsis Screening completed    (  )Patient meets SIRS criteria. (  x)Patient does not meet SIRS criteria.       SIRS Criteria is achieved when two or more of the following are present   Temperature < 96.8°F (36°C) or > 100.9°F (38.3°C)   Heart Rate > 90 beats per minute   Respiratory Rate > 20 breaths per minute   WBC count > 12,000 or <4,000 or > 10% bands

## 2018-01-14 NOTE — ED PROVIDER NOTES
EMERGENCY DEPARTMENT HISTORY AND PHYSICAL EXAM    Date: 1/13/2018  Patient Name: Moises Hugo    History of Presenting Illness     Chief Complaint   Patient presents with    Shoulder Pain         History Provided By: Patient    Chief Complaint: Right shoulder pain  Duration: 1 hour  Timing:  Acute and Constant  Location: Right shoulder  Quality: Aching  Severity: 7 out of 10  Modifying Factors: Worsened with movement  Associated Symptoms: right knee abrasion    Additional History (Context):   10:54 PM  Brandon Lucero is a 60 y.o. female with PMHX foot drop and arthritis who presents ambulatory to the emergency department C/O aching right shoulder pain s/p fall (rated 7/10), onset 1 hour ago. Worsened with movement. Associated sxs include right knee abrasion. Pt states that she missed her step and fell. Denies any hx of peptic ulcers or cardiac issues. Pt denies CP, head injury, LOC, neck pain, dizziness, gi sxs, bleed, or any other sxs or complaints. PCP: Dionne Hunter, DO     Current Outpatient Prescriptions   Medication Sig Dispense Refill    ibuprofen (MOTRIN) 600 mg tablet Take 1 Tab by mouth every six (6) hours as needed for Pain. 20 Tab 0    HYDROcodone-acetaminophen (NORCO) 5-325 mg per tablet Take 1 tablets PO every 6 hours as needed for pain control. If over the counter ibuprofen or acetaminophen was suggested, then only take the vicodin for pain not well controlled with the over the counter medication. 12 Tab 0    docusate sodium (COLACE) 100 mg capsule Take 1 Cap by mouth two (2) times daily as needed for Constipation for up to 10 days. 20 Cap 0    aspirin delayed-release 81 mg tablet Take  by mouth daily.  fluticasone (FLONASE) 50 mcg/actuation nasal spray 2 Sprays by Both Nostrils route daily as needed for Rhinitis.  calcium-cholecalciferol, d3, (CALCIUM 600 + D) 600-125 mg-unit tab Take 1,200 mg by mouth.       cholecalciferol (VITAMIN D3) 1,000 unit cap Take 5,000 Units by mouth daily.      krill oil 500 mg cap Take 500 mg by mouth daily.  loratadine (CLARITIN) 10 mg tablet Take 10 mg by mouth daily as needed for Allergies. Past History     Past Medical History:  Past Medical History:   Diagnosis Date    Arthritis     Chronic pain     lumbar, right leg and right foot       Past Surgical History:  History reviewed. No pertinent surgical history. Family History:  History reviewed. No pertinent family history. Social History:  Social History   Substance Use Topics    Smoking status: Former Smoker     Packs/day: 0.25     Years: 12.00     Quit date: 12/23/2014    Smokeless tobacco: Never Used    Alcohol use No       Allergies:  No Known Allergies      Review of Systems   Review of Systems   Cardiovascular: Negative for chest pain. Musculoskeletal: Positive for arthralgias (right shoulder). Skin:        (+) right knee abrasion   Neurological: Negative for syncope. All other systems reviewed and are negative. Physical Exam     Vitals:    01/13/18 2158 01/14/18 0107   BP: 150/69 142/64   Pulse: 67 71   Resp: 16 16   Temp: 98.2 °F (36.8 °C)    SpO2: 98% 98%   Weight: 81.6 kg (180 lb)    Height: 5' 5.5\" (1.664 m)      Physical Exam   Constitutional: She is oriented to person, place, and time. She appears well-developed and well-nourished. No distress. HENT:   Head: Normocephalic and atraumatic. Eyes: Pupils are equal, round, and reactive to light. Neck: Neck supple. Cardiovascular: Normal rate, regular rhythm, S1 normal, S2 normal and normal heart sounds. Pulmonary/Chest: Breath sounds normal. No respiratory distress. She has no wheezes. She has no rales. She exhibits no tenderness. Abdominal: Soft. She exhibits no distension and no mass. There is no tenderness. There is no guarding. Musculoskeletal: Normal range of motion. She exhibits no edema. Right hand: She exhibits tenderness. She exhibits no deformity.    Superficial abrasion to right knee, no deformity, good ROM. Tenderness to right thenar eminence, no deformity, good opposition of thumb, good ROM. Neurological: She is alert and oriented to person, place, and time. She has normal strength. She displays normal reflexes. No cranial nerve deficit or sensory deficit. She exhibits normal muscle tone. Coordination normal.   Slight foot drop which is chronic   Skin: Abrasion noted. No rash noted. Psychiatric: She has a normal mood and affect. Her behavior is normal. Thought content normal.   Nursing note and vitals reviewed. Diagnostic Study Results     Labs -   No results found for this or any previous visit (from the past 12 hour(s)). Radiologic Studies -    XR SHOULDER RT AP/LAT MIN 2 V    (Results Pending)     10:54 PM  RADIOLOGY FINDINGS  Right shoulder X-ray shows non displaced humoral head fx  Pending review by Radiologist  Recorded by Kamilah Cote ED Scribe, as dictated by Anival Garcia MD    CT Results  (Last 48 hours)    None        CXR Results  (Last 48 hours)    None            Medical Decision Making   I am the first provider for this patient. I reviewed the vital signs, available nursing notes, past medical history, past surgical history, family history and social history. Vital Signs-Reviewed the patient's vital signs. Pulse Oximetry Analysis - 98% on RA     Records Reviewed: Nursing Notes    Provider Notes (Medical Decision Making):   DDx: Fx, dislocation, strain, sprain, contusion    Procedures:  Procedures    ED Course:   10:54 PM   Initial assessment performed. The patients presenting problems have been discussed, and they are in agreement with the care plan formulated and outlined with them. I have encouraged them to ask questions as they arise throughout their visit. Diagnosis and Disposition       DISCHARGE NOTE:  11:07 PM  Brandon LOZADA Marlin's  results have been reviewed with her. She has been counseled regarding her diagnosis, treatment, and plan.   She verbally conveys understanding and agreement of the signs, symptoms, diagnosis, treatment and prognosis and additionally agrees to follow up as discussed. She also agrees with the care-plan and conveys that all of her questions have been answered. I have also provided discharge instructions for her that include: educational information regarding their diagnosis and treatment, and list of reasons why they would want to return to the ED prior to their follow-up appointment, should her condition change. She has been provided with education for proper emergency department utilization. CLINICAL IMPRESSION:    1. Closed fracture of head of right humerus, initial encounter    2. Fall, initial encounter    3. Abrasion of right knee, initial encounter        PLAN:  1. D/C Home  2. Discharge Medication List as of 1/13/2018 11:08 PM      START taking these medications    Details   ibuprofen (MOTRIN) 600 mg tablet Take 1 Tab by mouth every six (6) hours as needed for Pain., Normal, Disp-20 Tab, R-0      HYDROcodone-acetaminophen (NORCO) 5-325 mg per tablet Take 1 tablets PO every 6 hours as needed for pain control. If over the counter ibuprofen or acetaminophen was suggested, then only take the vicodin for pain not well controlled with the over the counter medication. , Print, Disp-12 Tab, R-0      docusate sodium (COLACE) 100 mg capsule Take 1 Cap by mouth two (2) times daily as needed for Constipation for up to 10 days. , Normal, Disp-20 Cap, R-0         CONTINUE these medications which have NOT CHANGED    Details   aspirin delayed-release 81 mg tablet Take  by mouth daily. , Historical Med      fluticasone (FLONASE) 50 mcg/actuation nasal spray 2 Sprays by Both Nostrils route daily as needed for Rhinitis., Historical Med      calcium-cholecalciferol, d3, (CALCIUM 600 + D) 600-125 mg-unit tab Take 1,200 mg by mouth., Historical Med      cholecalciferol (VITAMIN D3) 1,000 unit cap Take 5,000 Units by mouth daily. , Historical Med      krill oil 500 mg cap Take 500 mg by mouth daily. , Historical Med      loratadine (CLARITIN) 10 mg tablet Take 10 mg by mouth daily as needed for Allergies. , Historical Med           3. Follow-up Information     Follow up With Details Comments [facility] [place],  Schedule an appointment as soon as possible for a visit in 2 days for PCP follow up [facility]      Alicia Chavarria MD Schedule an appointment as soon as possible for a visit in 1 day for orthopedic follow up [address] [place] [address]      [facility] EMERGENCY DEPT  As needed, If symptoms worsen [address] [id number]  [phone]        _______________________________    Attestations: This note is prepared by Yashira Saenz, acting as Scribe for Leonetta Mcardle, MD.    Leonetta Mcardle, MD:  The scribe's documentation has been prepared under my direction and personally reviewed by me in its entirety.   I confirm that the note above accurately reflects all work, treatment, procedures, and medical decision making performed by me.  _______________________________

## 2018-01-14 NOTE — ED NOTES
Pt discharged home stable and ambulatory. Pain level at discharge 5. Pt discharged with marquez. Reviewed discharged instructions with patient who verbalized understanding.   Patient armband removed and shredded

## 2018-05-25 ENCOUNTER — HOSPITAL ENCOUNTER (OUTPATIENT)
Dept: LAB | Age: 63
Discharge: HOME | End: 2018-05-25
Payer: COMMERCIAL

## 2018-05-25 ENCOUNTER — HOSPITAL ENCOUNTER (OUTPATIENT)
Dept: NON INVASIVE DIAGNOSTICS | Age: 63
Discharge: HOME | End: 2018-05-25
Payer: COMMERCIAL

## 2018-05-25 LAB
HCT VFR BLD AUTO: 44 % (ref 35–45)
HGB BLD-MCNC: 14.4 G/DL (ref 12–16)
POTASSIUM SERPL-SCNC: 4.1 MMOL/L (ref 3.5–5.5)

## 2018-05-25 PROCEDURE — 93005 ELECTROCARDIOGRAM TRACING: CPT

## 2018-05-25 PROCEDURE — 36415 COLL VENOUS BLD VENIPUNCTURE: CPT | Performed by: PODIATRIST

## 2018-05-25 PROCEDURE — 85018 HEMOGLOBIN: CPT | Performed by: PODIATRIST

## 2018-05-25 PROCEDURE — 84132 ASSAY OF SERUM POTASSIUM: CPT | Performed by: PODIATRIST

## 2018-05-26 LAB
ATRIAL RATE: 58 BPM
CALCULATED P AXIS, ECG09: 67 DEGREES
CALCULATED R AXIS, ECG10: 67 DEGREES
CALCULATED T AXIS, ECG11: 53 DEGREES
DIAGNOSIS, 93000: NORMAL
P-R INTERVAL, ECG05: 148 MS
Q-T INTERVAL, ECG07: 434 MS
QRS DURATION, ECG06: 90 MS
QTC CALCULATION (BEZET), ECG08: 426 MS
VENTRICULAR RATE, ECG03: 58 BPM

## 2018-05-30 LAB
FAX TO INFO,FAXT: NORMAL
FAX TO NUMBER,FAXN: NORMAL

## 2018-11-15 ENCOUNTER — HOSPITAL ENCOUNTER (OUTPATIENT)
Dept: PREADMISSION TESTING | Age: 63
Discharge: HOME | End: 2018-11-15
Payer: COMMERCIAL

## 2018-11-15 LAB
HCT VFR BLD AUTO: 42.3 % (ref 35–45)
HGB BLD-MCNC: 13.9 G/DL (ref 12–16)
POTASSIUM SERPL-SCNC: 4 MMOL/L (ref 3.5–5.5)

## 2018-11-15 PROCEDURE — 93005 ELECTROCARDIOGRAM TRACING: CPT

## 2018-11-15 PROCEDURE — 85018 HEMOGLOBIN: CPT

## 2018-11-15 PROCEDURE — 36415 COLL VENOUS BLD VENIPUNCTURE: CPT

## 2018-11-15 PROCEDURE — 84132 ASSAY OF SERUM POTASSIUM: CPT

## 2018-11-17 LAB
ATRIAL RATE: 58 BPM
CALCULATED P AXIS, ECG09: 44 DEGREES
CALCULATED R AXIS, ECG10: 30 DEGREES
CALCULATED T AXIS, ECG11: 1 DEGREES
DIAGNOSIS, 93000: NORMAL
P-R INTERVAL, ECG05: 138 MS
Q-T INTERVAL, ECG07: 412 MS
QRS DURATION, ECG06: 88 MS
QTC CALCULATION (BEZET), ECG08: 404 MS
VENTRICULAR RATE, ECG03: 58 BPM

## 2018-11-19 LAB
FAX TO INFO,FAXT: NORMAL
FAX TO NUMBER,FAXN: NORMAL